# Patient Record
Sex: FEMALE | Race: OTHER | Employment: FULL TIME | URBAN - METROPOLITAN AREA
[De-identification: names, ages, dates, MRNs, and addresses within clinical notes are randomized per-mention and may not be internally consistent; named-entity substitution may affect disease eponyms.]

---

## 2017-05-06 ENCOUNTER — HOSPITAL ENCOUNTER (INPATIENT)
Age: 37
LOS: 2 days | Discharge: PSYCHIATRIC OR SUBSTANCE USE FACILITY | DRG: 918 | End: 2017-05-08
Attending: STUDENT IN AN ORGANIZED HEALTH CARE EDUCATION/TRAINING PROGRAM | Admitting: INTERNAL MEDICINE
Payer: COMMERCIAL

## 2017-05-06 ENCOUNTER — APPOINTMENT (OUTPATIENT)
Dept: GENERAL RADIOLOGY | Age: 37
DRG: 918 | End: 2017-05-06
Attending: STUDENT IN AN ORGANIZED HEALTH CARE EDUCATION/TRAINING PROGRAM
Payer: COMMERCIAL

## 2017-05-06 DIAGNOSIS — T14.91XA SUICIDE ATTEMPT (HCC): ICD-10-CM

## 2017-05-06 DIAGNOSIS — T50.902A DRUG OVERDOSE, INTENTIONAL SELF-HARM, INITIAL ENCOUNTER (HCC): Primary | ICD-10-CM

## 2017-05-06 PROBLEM — T42.4X1A BENZODIAZEPINE OVERDOSE: Status: ACTIVE | Noted: 2017-05-06

## 2017-05-06 PROBLEM — R41.82 ALTERED MENTAL STATUS: Status: ACTIVE | Noted: 2017-05-06

## 2017-05-06 LAB
ALBUMIN SERPL BCP-MCNC: 3.7 G/DL (ref 3.5–5)
ALBUMIN/GLOB SERPL: 0.9 {RATIO} (ref 1.2–3.5)
ALP SERPL-CCNC: 60 U/L (ref 50–136)
ALT SERPL-CCNC: 20 U/L (ref 12–65)
ANION GAP BLD CALC-SCNC: 9 MMOL/L (ref 7–16)
APAP SERPL-MCNC: <2 UG/ML (ref 10–30)
AST SERPL W P-5'-P-CCNC: 19 U/L (ref 15–37)
BASOPHILS # BLD AUTO: 0.1 K/UL (ref 0–0.2)
BASOPHILS # BLD: 1 % (ref 0–2)
BILIRUB SERPL-MCNC: 0.2 MG/DL (ref 0.2–1.1)
BUN SERPL-MCNC: 10 MG/DL (ref 6–23)
CALCIUM SERPL-MCNC: 9 MG/DL (ref 8.3–10.4)
CHLORIDE SERPL-SCNC: 107 MMOL/L (ref 98–107)
CO2 SERPL-SCNC: 27 MMOL/L (ref 21–32)
CREAT SERPL-MCNC: 0.77 MG/DL (ref 0.6–1)
DIFFERENTIAL METHOD BLD: ABNORMAL
EOSINOPHIL # BLD: 0.6 K/UL (ref 0–0.8)
EOSINOPHIL NFR BLD: 7 % (ref 0.5–7.8)
ERYTHROCYTE [DISTWIDTH] IN BLOOD BY AUTOMATED COUNT: 14.1 % (ref 11.9–14.6)
ETHANOL SERPL-MCNC: <3 MG/DL
GLOBULIN SER CALC-MCNC: 4.1 G/DL (ref 2.3–3.5)
GLUCOSE SERPL-MCNC: 85 MG/DL (ref 65–100)
HCT VFR BLD AUTO: 36.8 % (ref 35.8–46.3)
HGB BLD-MCNC: 12.5 G/DL (ref 11.7–15.4)
IMM GRANULOCYTES # BLD: 0 K/UL (ref 0–0.5)
IMM GRANULOCYTES NFR BLD AUTO: 0.3 % (ref 0–5)
LYMPHOCYTES # BLD AUTO: 18 % (ref 13–44)
LYMPHOCYTES # BLD: 1.7 K/UL (ref 0.5–4.6)
MAGNESIUM SERPL-MCNC: 2.4 MG/DL (ref 1.8–2.4)
MCH RBC QN AUTO: 28 PG (ref 26.1–32.9)
MCHC RBC AUTO-ENTMCNC: 34 G/DL (ref 31.4–35)
MCV RBC AUTO: 82.5 FL (ref 79.6–97.8)
MONOCYTES # BLD: 0.5 K/UL (ref 0.1–1.3)
MONOCYTES NFR BLD AUTO: 5 % (ref 4–12)
NEUTS SEG # BLD: 6.9 K/UL (ref 1.7–8.2)
NEUTS SEG NFR BLD AUTO: 69 % (ref 43–78)
PLATELET # BLD AUTO: 286 K/UL (ref 150–450)
PMV BLD AUTO: 9.7 FL (ref 10.8–14.1)
POTASSIUM SERPL-SCNC: 4.1 MMOL/L (ref 3.5–5.1)
PROT SERPL-MCNC: 7.8 G/DL (ref 6.3–8.2)
RBC # BLD AUTO: 4.46 M/UL (ref 4.05–5.25)
SALICYLATES SERPL-MCNC: <1.7 MG/DL (ref 2.8–20)
SODIUM SERPL-SCNC: 143 MMOL/L (ref 136–145)
TROPONIN I BLD-MCNC: 0 NG/ML (ref 0–0.08)
WBC # BLD AUTO: 9.8 K/UL (ref 4.3–11.1)

## 2017-05-06 PROCEDURE — 85025 COMPLETE CBC W/AUTO DIFF WBC: CPT | Performed by: STUDENT IN AN ORGANIZED HEALTH CARE EDUCATION/TRAINING PROGRAM

## 2017-05-06 PROCEDURE — 80307 DRUG TEST PRSMV CHEM ANLYZR: CPT | Performed by: STUDENT IN AN ORGANIZED HEALTH CARE EDUCATION/TRAINING PROGRAM

## 2017-05-06 PROCEDURE — 96360 HYDRATION IV INFUSION INIT: CPT | Performed by: STUDENT IN AN ORGANIZED HEALTH CARE EDUCATION/TRAINING PROGRAM

## 2017-05-06 PROCEDURE — 71010 XR CHEST PORT: CPT

## 2017-05-06 PROCEDURE — 74011250636 HC RX REV CODE- 250/636: Performed by: STUDENT IN AN ORGANIZED HEALTH CARE EDUCATION/TRAINING PROGRAM

## 2017-05-06 PROCEDURE — 83735 ASSAY OF MAGNESIUM: CPT | Performed by: STUDENT IN AN ORGANIZED HEALTH CARE EDUCATION/TRAINING PROGRAM

## 2017-05-06 PROCEDURE — 84484 ASSAY OF TROPONIN QUANT: CPT

## 2017-05-06 PROCEDURE — 65270000029 HC RM PRIVATE

## 2017-05-06 PROCEDURE — 93005 ELECTROCARDIOGRAM TRACING: CPT | Performed by: STUDENT IN AN ORGANIZED HEALTH CARE EDUCATION/TRAINING PROGRAM

## 2017-05-06 PROCEDURE — 99285 EMERGENCY DEPT VISIT HI MDM: CPT | Performed by: STUDENT IN AN ORGANIZED HEALTH CARE EDUCATION/TRAINING PROGRAM

## 2017-05-06 PROCEDURE — 80053 COMPREHEN METABOLIC PANEL: CPT | Performed by: STUDENT IN AN ORGANIZED HEALTH CARE EDUCATION/TRAINING PROGRAM

## 2017-05-06 PROCEDURE — 74011250636 HC RX REV CODE- 250/636: Performed by: INTERNAL MEDICINE

## 2017-05-06 RX ORDER — SODIUM CHLORIDE 9 MG/ML
75 INJECTION, SOLUTION INTRAVENOUS CONTINUOUS
Status: DISCONTINUED | OUTPATIENT
Start: 2017-05-06 | End: 2017-05-07

## 2017-05-06 RX ADMIN — SODIUM CHLORIDE 1000 ML: 900 INJECTION, SOLUTION INTRAVENOUS at 19:32

## 2017-05-06 RX ADMIN — SODIUM CHLORIDE 75 ML/HR: 900 INJECTION, SOLUTION INTRAVENOUS at 22:06

## 2017-05-06 NOTE — ED PROVIDER NOTES
HPI Comments: 71-year-old female patient presents to emergency department via EMS with reports of Ativan ingestion approximately 2-3 hours prior to arrival.  Per EMS reports, patient had an argument with her  at a child's birthday party locked herself in a bathroom and ingested an unknown amount of 0.5 mg Ativan tablets. Patient was found on the floor in the bathroom and paramedics were called at that point. Patient arrives visibly sedate and unwilling to cooperate with questioning or exam.  No other information available at this time. Patient is a 39 y.o. female presenting with Ingested Medication. The history is provided by the patient, the EMS personnel and a friend. No  was used. Drug Overdose   This is a new problem. The current episode started 3 to 5 hours ago. The problem occurs constantly. The problem has not changed since onset. Nothing aggravates the symptoms. Nothing relieves the symptoms. She has tried nothing for the symptoms. The treatment provided no relief. No past medical history on file. No past surgical history on file. No family history on file. Social History     Social History    Marital status: N/A     Spouse name: N/A    Number of children: N/A    Years of education: N/A     Occupational History    Not on file. Social History Main Topics    Smoking status: Not on file    Smokeless tobacco: Not on file    Alcohol use Not on file    Drug use: Not on file    Sexual activity: Not on file     Other Topics Concern    Not on file     Social History Narrative         ALLERGIES: Review of patient's allergies indicates not on file. Review of Systems   Unable to perform ROS: Mental status change       Vitals:    05/06/17 1921   BP: 109/61   Pulse: 92   Resp: 14   Temp: 99.6 °F (37.6 °C)   SpO2: 100%   Weight: 65.8 kg (145 lb)   Height: 5' 5\" (1.651 m)            Physical Exam   Constitutional: She is oriented to person, place, and time. She appears well-developed and well-nourished. No distress. visibly sedate on initial evaluation. Wakes with light Sternal rub though unwilling to provide information. Speaks in clear, fluent sentences. No respiratory difficulty/distress   HENT:   Head: Normocephalic and atraumatic. Right Ear: External ear normal.   Left Ear: External ear normal.   Nose: Nose normal.   Mouth/Throat: Oropharynx is clear and moist.   No visible signs of trauma, hemotympanum or Hodge sign. Eyes: Conjunctivae and EOM are normal. Pupils are equal, round, and reactive to light. Neck: Normal range of motion. Neck supple. No JVD present. No tracheal deviation present. Cardiovascular: Normal rate, regular rhythm, S1 normal, S2 normal, normal heart sounds and intact distal pulses. Exam reveals no gallop, no distant heart sounds and no friction rub. No murmur heard. Pulmonary/Chest: Effort normal and breath sounds normal. No accessory muscle usage or stridor. No tachypnea and no bradypnea. No respiratory distress. She has no decreased breath sounds. She has no wheezes. She has no rhonchi. She has no rales. She exhibits no tenderness. Abdominal: Soft. Normal appearance. She exhibits no distension and no mass. There is no hepatosplenomegaly, splenomegaly or hepatomegaly. There is no tenderness. There is no rigidity, no rebound, no guarding, no CVA tenderness, no tenderness at McBurney's point and negative Carvalho's sign. Musculoskeletal: Normal range of motion. She exhibits no edema, tenderness or deformity. Neurological: She is alert and oriented to person, place, and time. No cranial nerve deficit. Skin: Skin is warm and dry. No rash noted. She is not diaphoretic. Psychiatric: Her affect is inappropriate. Her speech is delayed. She is slowed and withdrawn. Cognition and memory are impaired. She is noncommunicative. Sedate  She is inattentive. Nursing note and vitals reviewed.        MDM  Number of Diagnoses or Management Options  Drug overdose, intentional self-harm, initial encounter: new and requires workup  Suicide attempt [facility]): new and requires workup  Diagnosis management comments: EKG shows a normal sinus rhythm with a rate of 83 beats a minute. There is no evidence of acute ischemic change, interval prolongation or other abnormalities visible. Laboratory evaluation unremarkable. Patient remained vitally stable visit date. Recent  and friend arrive. State patient has a long history of depression, anxiety and obsessive-compulsive disorder. She's been prescribed Ativan in the past but discontinued this medication some time ago however had an unknown amount of these pills left at home. In addition the patient has been prescribed hydroxyzine 25 mg daily.  and friend states she may have taken these tablets as well as both of these bottles were found empty in the home.  denies any history of suicidal attempt though states patient regularly threatens to harm herself. Reports recent marital stress for which they have sought marital counseling. Patient psychiatric disorders currently managed through primary care. Denies any history of alcohol, tobacco or drug abuse. Patient will be admitted to the hospital at this time for further evaluation. Spoke with hospitalist who agrees to evaluate patient for admission. 9:31 PM         Amount and/or Complexity of Data Reviewed  Clinical lab tests: ordered and reviewed  Tests in the radiology section of CPT®: ordered and reviewed  Tests in the medicine section of CPT®: reviewed and ordered  Independent visualization of images, tracings, or specimens: yes    Risk of Complications, Morbidity, and/or Mortality  Presenting problems: moderate  Diagnostic procedures: low  Management options: moderate    Patient Progress  Patient progress: stable    ED Course       Procedures

## 2017-05-06 NOTE — ED TRIAGE NOTES
Patient to ed via ems from home with c/o overdose found by friend in bathroom--patient took unknown amount of ativan and hydroxyzine today--upon arrival to ed VSS but patient is minimally responsive to painful stimuli

## 2017-05-07 LAB
ALBUMIN SERPL BCP-MCNC: 3.2 G/DL (ref 3.5–5)
ALBUMIN/GLOB SERPL: 0.9 {RATIO} (ref 1.2–3.5)
ALP SERPL-CCNC: 57 U/L (ref 50–136)
ALT SERPL-CCNC: 17 U/L (ref 12–65)
AMPHET UR QL SCN: NEGATIVE
ANION GAP BLD CALC-SCNC: 11 MMOL/L (ref 7–16)
AST SERPL W P-5'-P-CCNC: 12 U/L (ref 15–37)
ATRIAL RATE: 83 BPM
BARBITURATES UR QL SCN: NEGATIVE
BASOPHILS # BLD AUTO: 0 K/UL (ref 0–0.2)
BASOPHILS # BLD: 0 % (ref 0–2)
BENZODIAZ UR QL: NEGATIVE
BILIRUB SERPL-MCNC: 0.5 MG/DL (ref 0.2–1.1)
BUN SERPL-MCNC: 12 MG/DL (ref 6–23)
CALCIUM SERPL-MCNC: 8.4 MG/DL (ref 8.3–10.4)
CALCULATED P AXIS, ECG09: 73 DEGREES
CALCULATED R AXIS, ECG10: 62 DEGREES
CALCULATED T AXIS, ECG11: 62 DEGREES
CANNABINOIDS UR QL SCN: NEGATIVE
CHLORIDE SERPL-SCNC: 108 MMOL/L (ref 98–107)
CO2 SERPL-SCNC: 25 MMOL/L (ref 21–32)
COCAINE UR QL SCN: NEGATIVE
COLLECTION COMMENT, COLCM: NORMAL
CREAT SERPL-MCNC: 0.74 MG/DL (ref 0.6–1)
DIAGNOSIS, 93000: NORMAL
DIFFERENTIAL METHOD BLD: ABNORMAL
EOSINOPHIL # BLD: 0.5 K/UL (ref 0–0.8)
EOSINOPHIL NFR BLD: 6 % (ref 0.5–7.8)
ERYTHROCYTE [DISTWIDTH] IN BLOOD BY AUTOMATED COUNT: 14.5 % (ref 11.9–14.6)
GLOBULIN SER CALC-MCNC: 3.7 G/DL (ref 2.3–3.5)
GLUCOSE SERPL-MCNC: 79 MG/DL (ref 65–100)
HCG UR QL: NEGATIVE
HCT VFR BLD AUTO: 34.2 % (ref 35.8–46.3)
HGB BLD-MCNC: 11.5 G/DL (ref 11.7–15.4)
IMM GRANULOCYTES # BLD: 0 K/UL (ref 0–0.5)
IMM GRANULOCYTES NFR BLD AUTO: 0.1 % (ref 0–5)
LYMPHOCYTES # BLD AUTO: 23 % (ref 13–44)
LYMPHOCYTES # BLD: 1.7 K/UL (ref 0.5–4.6)
MCH RBC QN AUTO: 27.6 PG (ref 26.1–32.9)
MCHC RBC AUTO-ENTMCNC: 33.6 G/DL (ref 31.4–35)
MCV RBC AUTO: 82 FL (ref 79.6–97.8)
METHADONE UR QL: NEGATIVE
MONOCYTES # BLD: 0.5 K/UL (ref 0.1–1.3)
MONOCYTES NFR BLD AUTO: 7 % (ref 4–12)
NEUTS SEG # BLD: 4.7 K/UL (ref 1.7–8.2)
NEUTS SEG NFR BLD AUTO: 64 % (ref 43–78)
OPIATES UR QL: NEGATIVE
P-R INTERVAL, ECG05: 148 MS
PCP UR QL: NEGATIVE
PLATELET # BLD AUTO: 255 K/UL (ref 150–450)
PMV BLD AUTO: 9.3 FL (ref 10.8–14.1)
POTASSIUM SERPL-SCNC: 3.7 MMOL/L (ref 3.5–5.1)
PROT SERPL-MCNC: 6.9 G/DL (ref 6.3–8.2)
Q-T INTERVAL, ECG07: 344 MS
QRS DURATION, ECG06: 84 MS
QTC CALCULATION (BEZET), ECG08: 404 MS
RBC # BLD AUTO: 4.17 M/UL (ref 4.05–5.25)
SODIUM SERPL-SCNC: 144 MMOL/L (ref 136–145)
VENTRICULAR RATE, ECG03: 83 BPM
WBC # BLD AUTO: 7.3 K/UL (ref 4.3–11.1)

## 2017-05-07 PROCEDURE — 85025 COMPLETE CBC W/AUTO DIFF WBC: CPT | Performed by: INTERNAL MEDICINE

## 2017-05-07 PROCEDURE — 81025 URINE PREGNANCY TEST: CPT | Performed by: INTERNAL MEDICINE

## 2017-05-07 PROCEDURE — 36415 COLL VENOUS BLD VENIPUNCTURE: CPT | Performed by: INTERNAL MEDICINE

## 2017-05-07 PROCEDURE — 65270000029 HC RM PRIVATE

## 2017-05-07 PROCEDURE — 80053 COMPREHEN METABOLIC PANEL: CPT | Performed by: INTERNAL MEDICINE

## 2017-05-07 PROCEDURE — 74011250636 HC RX REV CODE- 250/636: Performed by: FAMILY MEDICINE

## 2017-05-07 PROCEDURE — 80307 DRUG TEST PRSMV CHEM ANLYZR: CPT | Performed by: STUDENT IN AN ORGANIZED HEALTH CARE EDUCATION/TRAINING PROGRAM

## 2017-05-07 RX ORDER — PAROXETINE HYDROCHLORIDE 20 MG/1
40 TABLET, FILM COATED ORAL DAILY
Status: DISCONTINUED | OUTPATIENT
Start: 2017-05-08 | End: 2017-05-08 | Stop reason: HOSPADM

## 2017-05-07 RX ORDER — PAROXETINE 10 MG/1
TABLET, FILM COATED ORAL DAILY
COMMUNITY
End: 2017-05-08

## 2017-05-07 RX ORDER — HYDROXYZINE HYDROCHLORIDE 10 MG/1
TABLET, FILM COATED ORAL
COMMUNITY

## 2017-05-07 RX ORDER — LANOLIN ALCOHOL/MO/W.PET/CERES
CREAM (GRAM) TOPICAL
COMMUNITY

## 2017-05-07 RX ORDER — GLUCOSAMINE SULFATE 1500 MG
POWDER IN PACKET (EA) ORAL DAILY
COMMUNITY

## 2017-05-07 RX ORDER — LORAZEPAM 0.5 MG/1
TABLET ORAL
COMMUNITY

## 2017-05-07 RX ORDER — ENOXAPARIN SODIUM 100 MG/ML
40 INJECTION SUBCUTANEOUS EVERY 24 HOURS
Status: DISCONTINUED | OUTPATIENT
Start: 2017-05-07 | End: 2017-05-08 | Stop reason: HOSPADM

## 2017-05-07 RX ADMIN — ENOXAPARIN SODIUM 40 MG: 40 INJECTION SUBCUTANEOUS at 23:31

## 2017-05-07 NOTE — PROGRESS NOTES
Patient more conversant with staff, remains drowsy at times. Database completed with assistance of spouse with friend present.   Patient some of her meal.

## 2017-05-07 NOTE — ED NOTES
Patient resting quietly in room. Respirations are even and unlabored. Patient appears in no acute distress at this time.

## 2017-05-07 NOTE — ED NOTES
TRANSFER - OUT REPORT:    Verbal report given to [address], RN (name) on Bib Rendon  being transferred to med/surg (unit) for routine progression of care       Report consisted of patients Situation, Background, Assessment and   Recommendations(SBAR). Information from the following report(s) SBAR, ED Summary and MAR was reviewed with the receiving nurse. Lines:   Peripheral IV 05/06/17 Left Antecubital (Active)        Opportunity for questions and clarification was provided.       Patient transported with:   Sibaritus

## 2017-05-07 NOTE — H&P
HOSPITALIST H&P/CONSULT  NAME:  Mane Florence   Age:  39 y.o.  :   1980   MRN:   [id number]  PCP: Jose Mancera MD  Treatment Team: Attending Provider: Ruben Ayala DO; Primary Nurse: Clinton Espinoza RN; Primary Nurse: Gale Lynn RN  HPI:   Ino Power is a 14-year-old [place] [facility] female who was brought to the ED by EMS with reports of Ativan ingestion approximately 2-3 hours prior to arrival.  Patient could not give history as she is only arousable but not talking. Per EMS reports, patient had an argument with her  at a child's birthday party locked herself in a bathroom and ingested an unknown amount of 0.5 mg Ativan tablets. Patient was found on the floor in the bathroom and paramedics were called at that point. Patient arrived ED  visibly sedated and unwilling to cooperate with questioning.  is not around Physical exam unremarkable but for tenderness to palpation in the epigastrum and suprapubic area. Initial work up with CBC,CMP,EKG were negative.       Complete ROS could not be done as patient is not speaking ,otherwise as stated in HPI. No past medical history on file. No past surgical history on file. None     Allergies not on file   Social History   Substance Use Topics    Smoking status: Not on file    Smokeless tobacco: Not on file    Alcohol use Not on file      No family history on file. Objective:     Visit Vitals    /63    Pulse 75    Temp 99.6 °F (37.6 °C)    Resp 16    Ht 5' 5\" (1.651 m)    Wt 65.8 kg (145 lb)    SpO2 99%    BMI 24.13 kg/m2      Temp (24hrs), Av.6 °F (37.6 °C), Min:99.6 °F (37.6 °C), Max:99.6 °F (37.6 °C)    Oxygen Therapy  O2 Sat (%): 99 % (17)  Pulse via Oximetry: 76 beats per minute (17)  O2 Device: Room air (17)  Physical Exam:  General:    Well developed ,well nourished [place] [facility] female who is sedated but arousable and has purposeful response to noxious stimuli.    Head:   Normocephalic, atraumatic,EOMI,PERRLA,Neck supple. Nose:  Nares normal. No drainage or sinus tenderness. Lungs:   Clear to auscultation bilaterally. No Wheezing or Rhonchi. No rales. Heart:   S1S2 Regular rate and rhythm,  no murmur, rub or gallop. Abdomen:   Flat ,moves with respiration,soft, + tender epigastrum and suprapubic area. Bowel sounds normal.   Extremities: No cyanosis. No edema. No clubbing  Skin:     Texture, turgor normal. No rashes or lesions. Not Jaundiced  Neurologic: Lethargic but arousable  With calls of her name and noxious stimuli. No focal deficit elicited. Data Review:   Recent Results (from the past 24 hour(s))   METABOLIC PANEL, COMPREHENSIVE    Collection Time: 05/06/17  7:29 PM   Result Value Ref Range    Sodium 143 136 - 145 mmol/L    Potassium 4.1 3.5 - 5.1 mmol/L    Chloride 107 98 - 107 mmol/L    CO2 27 21 - 32 mmol/L    Anion gap 9 7 - 16 mmol/L    Glucose 85 65 - 100 mg/dL    BUN 10 6 - 23 MG/DL    Creatinine 0.77 0.6 - 1.0 MG/DL    GFR est AA >60 >60 ml/min/1.73m2    GFR est non-AA >60 >60 ml/min/1.73m2    Calcium 9.0 8.3 - 10.4 MG/DL    Bilirubin, total 0.2 0.2 - 1.1 MG/DL    ALT (SGPT) 20 12 - 65 U/L    AST (SGOT) 19 15 - 37 U/L    Alk.  phosphatase 60 50 - 136 U/L    Protein, total 7.8 6.3 - 8.2 g/dL    Albumin 3.7 3.5 - 5.0 g/dL    Globulin 4.1 (H) 2.3 - 3.5 g/dL    A-G Ratio 0.9 (L) 1.2 - 3.5     ETHYL ALCOHOL    Collection Time: 05/06/17  7:29 PM   Result Value Ref Range    ALCOHOL(ETHYL),SERUM <3 MG/DL   ACETAMINOPHEN    Collection Time: 05/06/17  7:29 PM   Result Value Ref Range    ACETAMINOPHEN <2 (L) 10.0 - 30.0 ug/mL   CBC WITH AUTOMATED DIFF    Collection Time: 05/06/17  7:29 PM   Result Value Ref Range    WBC 9.8 4.3 - 11.1 K/uL    RBC 4.46 4.05 - 5.25 M/uL    HGB 12.5 11.7 - 15.4 g/dL    HCT 36.8 35.8 - 46.3 %    MCV 82.5 79.6 - 97.8 FL    MCH 28.0 26.1 - 32.9 PG    MCHC 34.0 31.4 - 35.0 g/dL    RDW 14.1 11.9 - 14.6 %    PLATELET 402 879 - 043 K/uL    MPV 9.7 (L) 10.8 - 14.1 FL    DF AUTOMATED      NEUTROPHILS 69 43 - 78 %    LYMPHOCYTES 18 13 - 44 %    MONOCYTES 5 4.0 - 12.0 %    EOSINOPHILS 7 0.5 - 7.8 %    BASOPHILS 1 0.0 - 2.0 %    IMMATURE GRANULOCYTES 0.3 0.0 - 5.0 %    ABS. NEUTROPHILS 6.9 1.7 - 8.2 K/UL    ABS. LYMPHOCYTES 1.7 0.5 - 4.6 K/UL    ABS. MONOCYTES 0.5 0.1 - 1.3 K/UL    ABS. EOSINOPHILS 0.6 0.0 - 0.8 K/UL    ABS. BASOPHILS 0.1 0.0 - 0.2 K/UL    ABS. IMM.  GRANS. 0.0 0.0 - 0.5 K/UL   MAGNESIUM    Collection Time: 05/06/17  7:29 PM   Result Value Ref Range    Magnesium 2.4 1.8 - 2.4 mg/dL   POC TROPONIN-I    Collection Time: 05/06/17  7:29 PM   Result Value Ref Range    Troponin-I (POC) 0 0.0 - 2.78 ng/ml   SALICYLATE    Collection Time: 05/06/17  9:05 PM   Result Value Ref Range    SALICYLATE <5.2 (L) 2.8 - 20.0 MG/DL         Assessment and Plan:AMS   -BENZODIAZEPINE OVERDOSE  -SUICIDE ATTEMPT  -ADMIT TO MEDICAL FLOOR  -IV HYDRATION  -[facility] MEDS IF ANY AS RECONCILED         Den Rooney MD

## 2017-05-07 NOTE — ED NOTES
Patient resting quietly in room. Respirations are even and unlabored.  Patient appears in no acute distress at this time

## 2017-05-07 NOTE — PROGRESS NOTES
TRANSFER - IN REPORT:    Verbal report received from [address], RN(name) on Montse Olson  being received from ED(unit) for routine progression of care      Report consisted of patients Situation, Background, Assessment and   Recommendations(SBAR). Information from the following report(s) SBAR and ED Summary was reviewed with the receiving nurse. Opportunity for questions and clarification was provided. Assessment completed upon patients arrival to unit and care assumed. Dual skin assessment with Jennifer Chiu RN no pressure sores or skin issues noted. Patient has earrings in place, has clothes at bedside and 's license in belonging bag. 24 hour sitter at bedside. Unable to complete database at this time, no family member present, pt with very limited [place]. Will need to arrange an .

## 2017-05-07 NOTE — PROGRESS NOTES
Hospitalist Progress Note    2017  Admit Date: 2017  7:20 PM   NAME: Stefania Krishnan   :  1980   MRN:  [id number]   Attending: Stefanie Acosta MD  PCP:  Isaias Mueller MD      Admitted for: suicide attempt with benzo overdose    SUBJECTIVE:   Patient seen- refuse to answer most ?'s but admits to suicide attempt and that she speaks [address] Review of Systems negative with exception of pertinent positives noted above  Past medical history unchanged from H&P    PHYSICAL EXAM     Visit Vitals    /68 (BP 1 Location: Left arm, BP Patient Position: At rest)    Pulse 77    Temp 98.1 °F (36.7 °C)    Resp 12    Ht 5' 5\" (1.651 m)    Wt 65.8 kg (145 lb)    SpO2 99%    BMI 24.13 kg/m2      Temp (24hrs), Av.5 °F (36.9 °C), Min:97.9 °F (36.6 °C), Max:99.6 °F (37.6 °C)    Oxygen Therapy  O2 Sat (%): 99 % (17 1131)  Pulse via Oximetry: 73 beats per minute (17 0630)  O2 Device: Room air (17 0630)  No intake or output data in the 24 hours ending 17 1536     General: No acute distress  mucous membranes pink and moist acyanotic anicteric  Neck:  Supple full range of motion  Lungs:  Air entry equal bilaterally, no crepitations rales rhonchi   Heart:  Regular rate and rhythm,  No murmur, rub, or gallop  Abdomen: Soft, Non distended, Non tender, no rebound guarding Positive bowel sounds  Extremities: No cyanosis, clubbing or edema  Neurologic:  No focal deficits  Musculoskeletal: no   Joint swelling tenderness erythema  Skin:  No erythema, rashes noted          LAB  No results for input(s): GLUCPOC in the last 72 hours.     No lab exists for component: [place]   Recent Labs      17   0902   WBC  7.3   HGB  11.5*   HCT  34.2*   PLT  255     Recent Labs      17   0902  17   1929   NA  144  143   K  3.7  4.1   CL  108*  107   CO2  25  27   GLU  79  85   BUN  12  10   CREA  0.74  0.77   MG   --   2.4   CA  8.4  9.0   ALB  3.2*  3.7   TBILI  0.5  0.2 ALT  17  20   SGOT  12*  19       EKG and imaging reviewed personally by me  Xr Chest Port    Result Date: 5/6/2017  EXAM:  XR CHEST PORT INDICATION:  R chest pain after fall COMPARISON:  None. FINDINGS: A portable AP radiograph of the chest was obtained at 2010 hours. The patient is on a cardiac monitor. The lungs are clear. The cardiac and mediastinal contours and pulmonary vascularity are normal.  The bones and soft tissues are grossly within normal limits. IMPRESSION: Normal chest.     Results for orders placed or performed during the hospital encounter of 05/06/17   EKG, 12 LEAD, INITIAL   Result Value Ref Range    Ventricular Rate 83 BPM    Atrial Rate 83 BPM    P-R Interval 148 ms    QRS Duration 84 ms    Q-T Interval 344 ms    QTC Calculation (Bezet) 404 ms    Calculated P Axis 73 degrees    Calculated R Axis 62 degrees    Calculated T Axis 62 degrees    Diagnosis       Normal sinus rhythm  Normal ECG  No previous ECGs available  Confirmed by Tyler Mari ([id number]) on 5/7/2017 9:15:04 AM       XR Results (most recent):    Results from [place] encounter on 05/06/17   XR CHEST PORT   Narrative EXAM:  XR CHEST PORT    INDICATION:  R chest pain after fall    COMPARISON:  None. FINDINGS: A portable AP radiograph of the chest was obtained at 2010 hours. The  patient is on a cardiac monitor. The lungs are clear. The cardiac and  mediastinal contours and pulmonary vascularity are normal.  The bones and soft  tissues are grossly within normal limits.           Impression IMPRESSION: Normal chest.            Active problems  Active Hospital Problems    Diagnosis Date Noted    Attempted suicide ([facility] [address] 05/06/2017    Altered mental status 05/06/2017    Benzodiazepine overdose 05/06/2017       ASSESSMENT  AND PLAN      · Suicide attempt- continue 1:1- follow up psych consult  · benzodiazepine overdose- cont to hold all sedatives and let it wear off  · Enlarged uterus- pregnancy test    DVT Prophylaxis: lovenox  Patient remains high risk for decompensation, given suicidal ideation    Signed By: Calvin Ko MD     May 7, 2017

## 2017-05-07 NOTE — PROGRESS NOTES
Remigio with [facility] reports once pt has test and cleared, has bed. Notified primary nurse Consandshona.
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
## 2017-05-07 NOTE — PROGRESS NOTES
Per Dr. Lisbet Lindsey, plan to involuntarily commit patient, awaiting faxed consult report. Await CB from Dr. Arnie Allen. Notified COURTNEY/Katherin and nursing supervisor Odalys.
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
## 2017-05-08 ENCOUNTER — APPOINTMENT (OUTPATIENT)
Dept: ULTRASOUND IMAGING | Age: 37
DRG: 918 | End: 2017-05-08
Attending: INTERNAL MEDICINE
Payer: COMMERCIAL

## 2017-05-08 VITALS
BODY MASS INDEX: 24.16 KG/M2 | TEMPERATURE: 97.8 F | RESPIRATION RATE: 15 BRPM | DIASTOLIC BLOOD PRESSURE: 69 MMHG | WEIGHT: 145 LBS | HEIGHT: 65 IN | HEART RATE: 88 BPM | SYSTOLIC BLOOD PRESSURE: 110 MMHG | OXYGEN SATURATION: 99 %

## 2017-05-08 PROBLEM — N83.299 OVARIAN CYST, COMPLEX: Status: ACTIVE | Noted: 2017-05-08

## 2017-05-08 PROCEDURE — 76856 US EXAM PELVIC COMPLETE: CPT

## 2017-05-08 PROCEDURE — 74011250636 HC RX REV CODE- 250/636: Performed by: INTERNAL MEDICINE

## 2017-05-08 PROCEDURE — 74011250637 HC RX REV CODE- 250/637: Performed by: INTERNAL MEDICINE

## 2017-05-08 RX ORDER — SODIUM CHLORIDE 9 MG/ML
250 INJECTION, SOLUTION INTRAVENOUS CONTINUOUS
Status: DISPENSED | OUTPATIENT
Start: 2017-05-08 | End: 2017-05-08

## 2017-05-08 RX ORDER — PAROXETINE HYDROCHLORIDE 40 MG/1
40 TABLET, FILM COATED ORAL DAILY
Qty: 30 TAB | Refills: 0 | Status: SHIPPED
Start: 2017-05-08

## 2017-05-08 RX ADMIN — SODIUM CHLORIDE 250 ML/HR: 900 INJECTION, SOLUTION INTRAVENOUS at 11:26

## 2017-05-08 RX ADMIN — PAROXETINE HYDROCHLORIDE 40 MG: 20 TABLET, FILM COATED ORAL at 08:51
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
## 2017-05-08 NOTE — PROGRESS NOTES
Care Management Interventions  Transition of Care Consult (CM Consult): Discharge Planning  Current Support Network: Other ([facility])  Confirm Follow Up Transport: Other (see comment) [facility] department)  Plan discussed with Pt/Family/Caregiver: Yes  Discharge Location  Discharge Placement:  ([facility])     Patient will transfer to [id number] this afternoon. Called and spoke with spouse and gave him the number and address of the facility. He was actually in a doctor's office with his little girl and could not talk long. Call to [phone] for transport and packet is at the . SO  [id number]. They will come when they can. Updated [facility] and the receiving MD is Dr. Lakia Nava. Subha Esquivel with [facility] and he has no idea when transport will arrive. It could be 20 minutes to several hours. Subha Schilling Chol
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
